# Patient Record
Sex: FEMALE | Race: WHITE | NOT HISPANIC OR LATINO | URBAN - METROPOLITAN AREA
[De-identification: names, ages, dates, MRNs, and addresses within clinical notes are randomized per-mention and may not be internally consistent; named-entity substitution may affect disease eponyms.]

---

## 2020-07-25 ENCOUNTER — TELEPHONE ENCOUNTER (OUTPATIENT)
Dept: URBAN - METROPOLITAN AREA CLINIC 13 | Facility: CLINIC | Age: 40
End: 2020-07-25

## 2020-07-25 RX ORDER — POLYETHYLENE GLYCOL 3350, SODIUM CHLORIDE, SODIUM BICARBONATE AND POTASSIUM CHLORIDE WITH LEMON FLAVOR 420; 11.2; 5.72; 1.48 G/4L; G/4L; G/4L; G/4L
TAKE 1/2 GALLON AT 5:00 PM DAY BEFORE PROCEDURE, TAKE SECOND 1/2 OF GALLON 6 HRS PRIOR TO PROCEDURE POWDER, FOR SOLUTION ORAL
Qty: 1 | Refills: 0 | OUTPATIENT
Start: 2019-12-02 | End: 2019-12-04

## 2020-07-25 RX ORDER — ONDANSETRON HYDROCHLORIDE 4 MG/1
TAKE 1 TABLET BY MOUTH EVERY 8 HOURS AS NEEDED TABLET, FILM COATED ORAL
Qty: 15 | Refills: 0 | OUTPATIENT
Start: 2019-09-23 | End: 2019-11-19

## 2020-07-25 RX ORDER — HYDROCODONE BITARTRATE AND ACETAMINOPHEN 5; 325 MG/1; MG/1
TAKE 1 TABLET EVERY 6 HOURS AS NEEDED TABLET ORAL
Qty: 25 | Refills: 0 | OUTPATIENT
Start: 2019-09-23 | End: 2019-11-19

## 2020-07-25 RX ORDER — DICYCLOMINE HYDROCHLORIDE 10 MG/1
TAKE 1 CAPSULE BY MOUTH EVERY 4 TO 6 HOURS AS NEEDED FOR ABDOMINAL PAIN CAPSULE ORAL
Qty: 30 | Refills: 3 | OUTPATIENT
Start: 2019-07-25 | End: 2019-12-19

## 2020-07-25 RX ORDER — TRAMADOL HYDROCHLORIDE 50 MG/1
TAKE 1 TABLET BY MOUTH EVERY 8 HOURS AS NEEDED FOR PAIN TABLET ORAL
Qty: 24 | Refills: 0 | OUTPATIENT
Start: 2019-09-27 | End: 2019-11-19

## 2020-07-26 ENCOUNTER — TELEPHONE ENCOUNTER (OUTPATIENT)
Dept: URBAN - METROPOLITAN AREA CLINIC 13 | Facility: CLINIC | Age: 40
End: 2020-07-26

## 2020-07-26 RX ORDER — SULFAMETHOXAZOLE AND TRIMETHOPRIM 800; 160 MG/1; MG/1
TAKE ONE TABLET BY MOUTH TWICE A DAY FOR 3 DAYS TABLET ORAL
Qty: 6 | Refills: 0 | Status: ACTIVE | COMMUNITY
Start: 2019-01-23

## 2020-07-26 RX ORDER — NAPROXEN 500 MG/1
TAKE 1 TABLET BY MOUTH TWICE A DAY WITH FOOD TABLET ORAL
Qty: 30 | Refills: 0 | Status: ACTIVE | COMMUNITY
Start: 2019-09-09

## 2020-07-26 RX ORDER — AMITRIPTYLINE HYDROCHLORIDE 10 MG/1
TAKE 1 TABLET AT BEDTIME TABLET, FILM COATED ORAL
Qty: 30 | Refills: 11 | Status: ACTIVE | COMMUNITY
Start: 2019-12-19

## 2020-07-26 RX ORDER — ACETAMINOPHEN AND CODEINE PHOSPHATE 300; 30 MG/1; MG/1
TAKE 1 TABLET BY MOUTH EVERY 4 TO 6 HOURS AS NEEDED FOR PAIN TABLET ORAL
Qty: 20 | Refills: 0 | Status: ACTIVE | COMMUNITY
Start: 2019-09-09

## 2020-07-26 RX ORDER — PHENAZOPYRIDINE HYDROCHLORIDE 200 MG/1
TAKE ONE TABLET 3 TIMES A DAY AS NEEDED FOR DYSURIA TABLET, FILM COATED ORAL
Qty: 6 | Refills: 0 | Status: ACTIVE | COMMUNITY
Start: 2019-01-23

## 2020-09-16 ENCOUNTER — OFFICE VISIT (OUTPATIENT)
Dept: URBAN - METROPOLITAN AREA CLINIC 113 | Facility: CLINIC | Age: 40
End: 2020-09-16
Payer: COMMERCIAL

## 2020-09-16 VITALS
BODY MASS INDEX: 23.46 KG/M2 | WEIGHT: 146 LBS | HEART RATE: 74 BPM | TEMPERATURE: 98.4 F | SYSTOLIC BLOOD PRESSURE: 95 MMHG | HEIGHT: 66 IN | DIASTOLIC BLOOD PRESSURE: 64 MMHG

## 2020-09-16 DIAGNOSIS — R10.9 RIGHT FLANK PAIN: ICD-10-CM

## 2020-09-16 DIAGNOSIS — Z86.010 HISTORY OF ADENOMATOUS POLYP OF COLON: ICD-10-CM

## 2020-09-16 PROBLEM — 429047008: Status: ACTIVE | Noted: 2020-09-16

## 2020-09-16 PROCEDURE — G8427 DOCREV CUR MEDS BY ELIG CLIN: HCPCS | Performed by: NURSE PRACTITIONER

## 2020-09-16 PROCEDURE — 99213 OFFICE O/P EST LOW 20 MIN: CPT | Performed by: NURSE PRACTITIONER

## 2020-09-16 RX ORDER — PANTOPRAZOLE SODIUM 40 MG/1
1 TABLET TABLET, DELAYED RELEASE ORAL ONCE A DAY
Qty: 30 TABLET | Refills: 2 | OUTPATIENT

## 2020-09-16 NOTE — HPI-TODAY'S VISIT:
This is a 39-year-old female with a history of abdominal pain, bloating, constipation, and an adenomatous colon polyp due surveillance in 2024 presenting for fatty liver and gallstone evaluation.  She was last seen in December 2019.  She was prescribed amitriptyline to treat IBS.  She did not return for follow-up. She reports a history of an abnormality on a CT scan of the chest concerning for a "pericardial mass."  She has been having CT scans every 6 months.  Her last CT scan was performed at [facility] 6/16/2020 demonstrating possible fatty liver and possible gallstones.  She was concerned that her prior GI symptoms, which have significantly improved, may have been related.  She reports improvement of pain by 90%.  She had experienced "searing" pain indicated in the right upper quadrant radiating around to her right lower thoracic area posteriorly.  She reports associated bloating. She did not benefit from dicyclomine or amitriptyline.  She has persistent, less severe symptoms reporting mild pain in the right lower thoracic area posteriorly and laterally associated with eating fatty food or foods high in sugar content.  This may last for hours when it occurs.  Previously, she had loose stools for 2 years.  This has resolved.  She also reports significant decrease in abdominal distention.  She denies heartburn, dysphagia, nausea, or any other abdominal symptoms.  She admits that she was drinking heavily for 4 months after she was initially told that she had a pericardial mass because she was concerned this may be fatal.  She stopped drinking alcohol 95 days ago.  Symptoms began to improve when she stopped drinking.  She is recently been diagnosed with migraines.  She was prescribed medication today.  She does not recall the name of the medication and has yet to start it.  She denies NSAID use.  She reports labs last week  for a physical.  Values were normal.

## 2020-12-04 ENCOUNTER — OFFICE VISIT (OUTPATIENT)
Dept: URBAN - METROPOLITAN AREA CLINIC 113 | Facility: CLINIC | Age: 40
End: 2020-12-04
Payer: COMMERCIAL

## 2020-12-04 VITALS
TEMPERATURE: 98.4 F | WEIGHT: 152 LBS | SYSTOLIC BLOOD PRESSURE: 128 MMHG | HEART RATE: 88 BPM | DIASTOLIC BLOOD PRESSURE: 75 MMHG | HEIGHT: 66 IN | BODY MASS INDEX: 24.43 KG/M2

## 2020-12-04 DIAGNOSIS — R10.11 RUQ ABDOMINAL PAIN: ICD-10-CM

## 2020-12-04 DIAGNOSIS — K76.0 FATTY LIVER: ICD-10-CM

## 2020-12-04 PROCEDURE — G8420 CALC BMI NORM PARAMETERS: HCPCS | Performed by: INTERNAL MEDICINE

## 2020-12-04 PROCEDURE — 99213 OFFICE O/P EST LOW 20 MIN: CPT | Performed by: INTERNAL MEDICINE

## 2020-12-04 PROCEDURE — G8483 FLU IMM NO ADMIN DOC REA: HCPCS | Performed by: INTERNAL MEDICINE

## 2020-12-04 PROCEDURE — G8427 DOCREV CUR MEDS BY ELIG CLIN: HCPCS | Performed by: INTERNAL MEDICINE

## 2020-12-04 PROCEDURE — G9903 PT SCRN TBCO ID AS NON USER: HCPCS | Performed by: INTERNAL MEDICINE

## 2020-12-04 RX ORDER — DICYCLOMINE HYDROCHLORIDE 10 MG/1
1 TABLET CAPSULE ORAL
Qty: 60 | Refills: 1 | OUTPATIENT
Start: 2020-12-04 | End: 2021-02-02

## 2020-12-04 RX ORDER — PROMETHAZINE HYDROCHLORIDE 25 MG/1
1 TABLET AS NEEDED TABLET ORAL
Status: ACTIVE | COMMUNITY

## 2020-12-04 RX ORDER — UBROGEPANT 50 MG/1
1 TABLET MAY TAKE SECOND DOSE AT LEAST 2 HOURS AFTER FIRST DOSE AS NEEDED TABLET ORAL PRN
Status: ACTIVE | COMMUNITY

## 2020-12-04 RX ORDER — PANTOPRAZOLE SODIUM 40 MG/1
1 TABLET TABLET, DELAYED RELEASE ORAL ONCE A DAY
Qty: 30 TABLET | Refills: 2 | Status: ON HOLD | COMMUNITY

## 2020-12-04 RX ORDER — NORTRIPTYLINE HYDROCHLORIDE 10 MG/1
2 CAPSULE CAPSULE ORAL ONCE A DAY
OUTPATIENT

## 2020-12-04 RX ORDER — NORTRIPTYLINE HYDROCHLORIDE 10 MG/1
2 CAPSULE CAPSULE ORAL ONCE A DAY
Status: ACTIVE | COMMUNITY

## 2020-12-04 RX ORDER — PANTOPRAZOLE SODIUM 40 MG/1
1 TABLET TABLET, DELAYED RELEASE ORAL ONCE A DAY
OUTPATIENT

## 2020-12-04 RX ORDER — SUMATRIPTAN SUCCINATE 100 MG/1
1 TABLET AT LEAST 2 HOURS BETWEEN DOSES AS NEEDED TABLET, FILM COATED ORAL PRN
Status: ACTIVE | COMMUNITY

## 2020-12-04 NOTE — HPI-TODAY'S VISIT:
Ms Whitaker is a 40-year-old woman with a history of unexplained abdominal pain, constipation and fatty liver presenting for follow-up regarding right-sided pain. She was last seen on 9/16/2020 for follow-up regarding right upper quadrant abdominal pain, bloating and loose stools with previous unremarkable HIDA scan, right upper quadrant ultrasound, CT and colonoscopy.  There is a question on CT of cholelithiasis, but her symptoms were atypical for biliary colic.  She is recommended to trial pantoprazole 40 mg daily for any acid peptic disease. She continues to experience waxing and waning right upper quadrant pain.  The pain is exacerbated by certain foods such as dairy products and seems to be improved by not eating.  She is abstained from alcohol since June.  She trialed a proton pump inhibitor without any significant improvement.  She denies any heartburn, regurgitation or dysphagia.  She also denies any nausea or vomiting.  Labs on 9/1/2020 show WBC 5.67, hemoglobin 11.8, platelets 296, BMP normal, LFTs normal.  She denies any change in bowel habits, melena or red blood per rectum.  Linda also seen neurology regarding the type of headache felt to be a sequela from COVID-19 infection.  She was recently started on nortriptyline 20 mg daily.

## 2021-01-10 ENCOUNTER — TELEPHONE ENCOUNTER (OUTPATIENT)
Dept: URBAN - METROPOLITAN AREA CLINIC 113 | Facility: CLINIC | Age: 41
End: 2021-01-10

## 2021-01-10 PROBLEM — 197321007: Status: ACTIVE | Noted: 2020-09-19

## 2021-06-07 ENCOUNTER — TELEPHONE ENCOUNTER (OUTPATIENT)
Dept: URBAN - METROPOLITAN AREA CLINIC 113 | Facility: CLINIC | Age: 41
End: 2021-06-07

## 2021-06-21 ENCOUNTER — DASHBOARD ENCOUNTERS (OUTPATIENT)
Age: 41
End: 2021-06-21

## 2021-06-21 ENCOUNTER — OFFICE VISIT (OUTPATIENT)
Dept: URBAN - METROPOLITAN AREA CLINIC 107 | Facility: CLINIC | Age: 41
End: 2021-06-21
Payer: COMMERCIAL

## 2021-06-21 VITALS
SYSTOLIC BLOOD PRESSURE: 108 MMHG | RESPIRATION RATE: 18 BRPM | DIASTOLIC BLOOD PRESSURE: 75 MMHG | HEART RATE: 73 BPM | HEIGHT: 66 IN | BODY MASS INDEX: 24.43 KG/M2 | WEIGHT: 152 LBS | TEMPERATURE: 98.4 F

## 2021-06-21 DIAGNOSIS — R10.9 RIGHT FLANK PAIN: ICD-10-CM

## 2021-06-21 PROBLEM — 162050009: Status: ACTIVE | Noted: 2020-09-16

## 2021-06-21 PROCEDURE — 99214 OFFICE O/P EST MOD 30 MIN: CPT | Performed by: INTERNAL MEDICINE

## 2021-06-21 RX ORDER — HYDROCODONE BITARTRATE AND ACETAMINOPHEN 5; 325 MG/1; MG/1
1 TABLET AS NEEDED TABLET ORAL
Status: ACTIVE | COMMUNITY

## 2021-06-21 RX ORDER — NORTRIPTYLINE HYDROCHLORIDE 10 MG/1
2 CAPSULE CAPSULE ORAL ONCE A DAY
Status: ON HOLD | COMMUNITY

## 2021-06-21 RX ORDER — ONDANSETRON 4 MG/1
1 TABLET ON THE TONGUE AND ALLOW TO DISSOLVE TABLET, ORALLY DISINTEGRATING ORAL ONCE A DAY
Status: ACTIVE | COMMUNITY

## 2021-06-21 RX ORDER — PROMETHAZINE HYDROCHLORIDE 25 MG/1
1 TABLET AS NEEDED TABLET ORAL
Status: ON HOLD | COMMUNITY

## 2021-06-21 RX ORDER — UBROGEPANT 50 MG/1
1 TABLET MAY TAKE SECOND DOSE AT LEAST 2 HOURS AFTER FIRST DOSE AS NEEDED TABLET ORAL PRN
Status: ON HOLD | COMMUNITY

## 2021-06-21 RX ORDER — PANTOPRAZOLE SODIUM 40 MG/1
1 TABLET TABLET, DELAYED RELEASE ORAL ONCE A DAY
Status: ON HOLD | COMMUNITY

## 2021-06-21 RX ORDER — SUMATRIPTAN SUCCINATE 100 MG/1
1 TABLET AT LEAST 2 HOURS BETWEEN DOSES AS NEEDED TABLET, FILM COATED ORAL PRN
Status: ON HOLD | COMMUNITY

## 2021-06-21 NOTE — HPI-TODAY'S VISIT:
40-year-old woman with a history of migraines, H. pylori, paravertebral cystic mass, constipation and fatty liver, presenting for follow-up regarding right-sided abdominal pain. She was last seen in the office on 12/4/2020 with continued right upper quadrant pain described as waxing and waning, exacerbated by certain foods such as dairy products and improved with not eating.  PPI therapy provided no improvement.  She had recently been started on nortriptyline for headaches, with hopeful benefit for modification of visceral hypersensitivity. She was recently seen in the ED on 6/6/2021 regarding abdominal pain located in the right lower quadrant.  Pain was described as 10 out of 10 in intensity without any alleviating factors.  Labs in the ER revealed a normal CBC, normal CMP with the exception of elevated glucose.  Liver function tests were normal.  A CT of the abdomen and pelvis revealed mild wall thickening without significant mesenteric stranding of multiple loops of proximal jejunum.  Additionally there was mild stranding around the short segment of the terminal ileum, findings possibly reflective of enteritis versus early or mild Crohn's disease. She complains of right sided abdominal pain, mostly lower. The pain can move around the abdomen. The pain is constant, and has been ongoing for the last 3 years. There is no fever or chills. The pain was not changed by nortiptyline. She has bowel movements daily without any straining.  Her digestion, she reports, is all normal. Dicyclomine is reported as ineffective. Antiemetics do not provide relief. No blood per rectum. She is tearful today.

## 2021-07-13 LAB
C-REACTIVE PROTEIN, QUANT: <1
FERRITIN, SERUM: 74
FOLATE (FOLIC ACID), SERUM: 14.8
IRON BIND.CAP.(TIBC): 327
IRON SATURATION: 22
IRON: 73
UIBC: 254
VITAMIN B12: 1308
VITAMIN D, 25-HYDROXY: 18.5

## 2021-07-20 ENCOUNTER — TELEPHONE ENCOUNTER (OUTPATIENT)
Dept: URBAN - METROPOLITAN AREA CLINIC 113 | Facility: CLINIC | Age: 41
End: 2021-07-20

## 2021-10-20 ENCOUNTER — OFFICE VISIT (OUTPATIENT)
Dept: URBAN - METROPOLITAN AREA CLINIC 113 | Facility: CLINIC | Age: 41
End: 2021-10-20